# Patient Record
Sex: MALE | Race: WHITE | Employment: UNEMPLOYED | URBAN - METROPOLITAN AREA
[De-identification: names, ages, dates, MRNs, and addresses within clinical notes are randomized per-mention and may not be internally consistent; named-entity substitution may affect disease eponyms.]

---
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
## 2024-06-28 ENCOUNTER — HOSPITAL ENCOUNTER (EMERGENCY)
Age: 19
Discharge: HOME | End: 2024-06-28
Payer: COMMERCIAL

## 2024-06-28 ENCOUNTER — APPOINTMENT (OUTPATIENT)
Dept: GENERAL RADIOLOGY | Age: 19
End: 2024-06-28
Payer: COMMERCIAL

## 2024-06-28 VITALS
HEART RATE: 67 BPM | RESPIRATION RATE: 16 BRPM | TEMPERATURE: 98 F | OXYGEN SATURATION: 100 % | BODY MASS INDEX: 20.3 KG/M2 | SYSTOLIC BLOOD PRESSURE: 131 MMHG | DIASTOLIC BLOOD PRESSURE: 70 MMHG | HEIGHT: 71 IN | WEIGHT: 145 LBS

## 2024-06-28 DIAGNOSIS — S62.339A CLOSED BOXER'S FRACTURE, INITIAL ENCOUNTER: Primary | ICD-10-CM

## 2024-06-28 PROCEDURE — 99284 EMERGENCY DEPT VISIT MOD MDM: CPT

## 2024-06-28 PROCEDURE — 29125 APPL SHORT ARM SPLINT STATIC: CPT

## 2024-06-28 PROCEDURE — 73130 X-RAY EXAM OF HAND: CPT

## 2024-06-28 RX ORDER — MELOXICAM 15 MG/1
15 TABLET ORAL DAILY
Qty: 30 TABLET | Refills: 0 | Status: SHIPPED | OUTPATIENT
Start: 2024-06-28 | End: 2024-07-28

## 2024-06-28 ASSESSMENT — PAIN DESCRIPTION - LOCATION: LOCATION: FINGER (COMMENT WHICH ONE)

## 2024-06-28 ASSESSMENT — LIFESTYLE VARIABLES
HOW MANY STANDARD DRINKS CONTAINING ALCOHOL DO YOU HAVE ON A TYPICAL DAY: PATIENT DOES NOT DRINK
HOW OFTEN DO YOU HAVE A DRINK CONTAINING ALCOHOL: NEVER

## 2024-06-28 ASSESSMENT — PAIN - FUNCTIONAL ASSESSMENT: PAIN_FUNCTIONAL_ASSESSMENT: 0-10

## 2024-06-28 ASSESSMENT — ENCOUNTER SYMPTOMS
ABDOMINAL PAIN: 0
FACIAL SWELLING: 0
SHORTNESS OF BREATH: 0
COUGH: 0
TROUBLE SWALLOWING: 0
VOMITING: 0
PHOTOPHOBIA: 0

## 2024-06-28 ASSESSMENT — PAIN SCALES - GENERAL: PAINLEVEL_OUTOF10: 6

## 2024-06-28 ASSESSMENT — PAIN DESCRIPTION - ORIENTATION: ORIENTATION: RIGHT

## 2024-06-29 NOTE — DISCHARGE INSTRUCTIONS
You have broken your hand.  Leave the splint in place until you see orthopedics.  Use mobic for pain.  Follow up with ortho. They should call you to set it up.  Return for new or worsening.    As we discussed, I did not find a life threatening cause of your symptoms today. However, THAT DOES NOT MEAN IT COULD NOT DEVELOP. If you develop ANY new or worsening symptoms, it is critical that you return for re-evaluation. This includes any symptoms that are concerning to you, especially symptoms such as fevers, severe pain, numbness, splint feeling too tight.   If you do not return for re-evaluation, you risk serious complications, including death.

## 2024-06-29 NOTE — ED PROVIDER NOTES
PA    Consent:     Consent obtained:  Verbal    Consent given by:  Patient    Risks, benefits, and alternatives were discussed: yes      Risks discussed:  Discoloration, numbness, pain and swelling  Universal protocol:     Procedure explained and questions answered to patient or proxy's satisfaction: yes      Relevant documents present and verified: yes      Patient identity confirmed:  Verbally with patient and arm band  Pre-procedure details:     Distal neurologic exam:  Normal    Distal perfusion: distal pulses strong and brisk capillary refill    Procedure details:     Location:  Hand    Hand location:  R hand    Strapping: no      Splint type:  Ulnar gutter    Supplies:  Cotton padding, elastic bandage and fiberglass    Attestation: Splint applied and adjusted personally by me    Post-procedure details:     Distal neurologic exam:  Normal    Distal perfusion: distal pulses strong, brisk capillary refill and unchanged      Procedure completion:  Tolerated well, no immediate complications    Post-procedure imaging: not applicable        Orders Placed This Encounter   Procedures    APPLY SPLINT: SPECIFY    SPLINT APPLICATION    XR HAND RIGHT (MIN 3 VIEWS)    [facility]        Medications given during this emergency department visit:  Medications - No data to display    New Prescriptions    MELOXICAM (MOBIC) 15 MG TABLET    Take 1 tablet by mouth daily        No past medical history on file.     No past surgical history on file.     Social History     Socioeconomic History    Marital status: Single        Previous Medications    No medications on file        Results for orders placed or performed during the hospital encounter of 06/28/24   XR HAND RIGHT (MIN 3 VIEWS)    Narrative    Right hand    INDICATION: Right hand pain    COMPARISON:  None    TECHNIQUE: Three views of the right hand were obtained.    FINDINGS: There is a nondisplaced dorsal apex angulated fracture at the neck

## 2024-06-29 NOTE — ED TRIAGE NOTES
Pt ambulated to triage    Pt states punched a refrigerator and hurt his right hand pinky finger.  Pt states the finger feels \"jammed\".      Finger and hand are visibly swollen.  Pt has full flexation with pain

## 2024-07-09 ENCOUNTER — HOSPITAL ENCOUNTER (EMERGENCY)
Age: 19
Discharge: HOME | End: 2024-07-09
Attending: EMERGENCY MEDICINE
Payer: COMMERCIAL

## 2024-07-09 VITALS
TEMPERATURE: 98.2 F | RESPIRATION RATE: 18 BRPM | DIASTOLIC BLOOD PRESSURE: 64 MMHG | HEIGHT: 71 IN | HEART RATE: 71 BPM | BODY MASS INDEX: 20.3 KG/M2 | WEIGHT: 145 LBS | SYSTOLIC BLOOD PRESSURE: 114 MMHG | OXYGEN SATURATION: 98 %

## 2024-07-09 DIAGNOSIS — R20.2 NUMBNESS AND TINGLING OF HAND: Primary | ICD-10-CM

## 2024-07-09 DIAGNOSIS — R20.0 NUMBNESS AND TINGLING OF HAND: Primary | ICD-10-CM

## 2024-07-09 PROCEDURE — 99282 EMERGENCY DEPT VISIT SF MDM: CPT

## 2024-07-09 NOTE — ED NOTES
PT HAS SPLINT TO RIGHT HAND PLACED 6/28 FOR BOXER FX, PT HASN'T SEEN ORTHO YET, STATES FINGERS GET intermittently hot and cold

## 2024-07-09 NOTE — CARE COORDINATION
SHANDA received a message from RN that patient called requesting a referral be sent to a [facility] provider as his insurance is out of network with [facility]. Chart reviewed, POA forwarded the patient's referral to a [facility] provider but SHANDA cannot see documentation with contact information for this practice. SW called the patient twice. Each time the individual who answered the phone states that SW has the wrong number. Second time the individual gave this SW the correct number for the patient [phone]. SHANDA called the patient, advised that referral had been forwarded, and that the [facility] office may be calling his friend and that's why he hasn't been able to schedule. SW encouraged the patient to call [facility] to confirm where the referral was sent and contact them directly to schedule.      Elaina Singh, [facility] [place] [facility]  Medical Social Worker  [facility]

## 2024-07-09 NOTE — ED PROVIDER NOTES
Emergency Department Provider Note       PCP: Marvel Rush MD   Age: 19 y.o.   Sex: male     DISPOSITION Decision To Discharge 07/09/2024 02:47:21 AM       ICD-10-CM    1. Numbness and tingling of hand  R20.0     R20.2           Medical Decision Making     19-year-old presents with hand pain and some numbness and tingling 12 days after splint application.  Symptoms resolved with removal of old splint and umbilication of new 1.     1 acute, uncomplicated illness or injury.      I independently ordered and reviewed each unique test.  I reviewed external records: ED visit note from an outside group.                   History     19-year-old male complains of right hand pain with some numbness and tingling to the fourth and fifth digits.  Patient seen 12 days ago for a boxer's fracture, given a ulnar gutter splint at that time.  Still awaiting to be seen by orthopedics, but noticed increased laxity within the splint and increasing numbness and tingling.    The history is provided by the patient and a significant other.     Physical Exam     Vitals signs and nursing note reviewed:  Vitals:    07/09/24 0149 07/09/24 0150 07/09/24 0153   BP:  114/64    Pulse:  71    Resp:  18    Temp: 98.2 °F (36.8 °C) 98.2 °F (36.8 °C)    TempSrc: Oral     SpO2:  98%    Weight:   65.8 kg (145 lb)   Height:   1.803 m (5' 11\")      Physical Exam  Vitals and nursing note reviewed.   Constitutional:       General: He is not in acute distress.  Musculoskeletal:         General: Deformity present. No swelling.      Comments: Right upper extremity with ulnar gutter splint, distal neurovascular intact.  Patient was fairly loose in the splint and the splint was replaced.   Neurological:      General: No focal deficit present.      Mental Status: He is alert and oriented to person, place, and time.   Psychiatric:         Mood and Affect: Mood normal.         Behavior: Behavior normal.        Procedures     Ortho Injury    Date/Time:

## 2024-07-09 NOTE — ED NOTES
I have reviewed discharge instructions with the patient.  The patient verbalized understanding.    Patient left ED via Discharge Method: ambulatory to Home.    Opportunity for questions and clarification provided.       Patient given 0 scripts.         To continue your aftercare when you leave the hospital, you may receive an automated call from our care team to check in on how you are doing.  This is a free service and part of our promise to provide the best care and service to meet your aftercare needs.” If you have questions, or wish to unsubscribe from this service please call [phone].  Thank you for Choosing our [facility] Emergency Department.